# Patient Record
Sex: FEMALE | Race: OTHER | Employment: FULL TIME | URBAN - METROPOLITAN AREA
[De-identification: names, ages, dates, MRNs, and addresses within clinical notes are randomized per-mention and may not be internally consistent; named-entity substitution may affect disease eponyms.]

---

## 2017-04-05 ENCOUNTER — OFFICE VISIT (OUTPATIENT)
Dept: OTOLARYNGOLOGY | Facility: CLINIC | Age: 67
End: 2017-04-05

## 2017-04-05 VITALS
HEIGHT: 67 IN | HEART RATE: 62 BPM | DIASTOLIC BLOOD PRESSURE: 69 MMHG | BODY MASS INDEX: 30.13 KG/M2 | WEIGHT: 192 LBS | SYSTOLIC BLOOD PRESSURE: 108 MMHG

## 2017-04-05 DIAGNOSIS — M26.69 TMJ INFLAMMATION: Primary | ICD-10-CM

## 2017-04-05 DIAGNOSIS — J01.41 ACUTE RECURRENT PANSINUSITIS: ICD-10-CM

## 2017-04-05 PROCEDURE — 99203 OFFICE O/P NEW LOW 30 MIN: CPT | Performed by: OTOLARYNGOLOGY

## 2017-04-05 PROCEDURE — 99212 OFFICE O/P EST SF 10 MIN: CPT | Performed by: OTOLARYNGOLOGY

## 2017-04-05 RX ORDER — CETIRIZINE HYDROCHLORIDE 5 MG/1
5 TABLET ORAL DAILY
COMMUNITY

## 2017-04-05 RX ORDER — AMOXICILLIN AND CLAVULANATE POTASSIUM 875; 125 MG/1; MG/1
1 TABLET, FILM COATED ORAL EVERY 12 HOURS
Qty: 20 TABLET | Refills: 0 | Status: SHIPPED | OUTPATIENT
Start: 2017-04-05 | End: 2017-04-15

## 2017-04-05 RX ORDER — IBUPROFEN 200 MG
200 TABLET ORAL EVERY 6 HOURS PRN
COMMUNITY

## 2017-04-05 RX ORDER — CITALOPRAM 10 MG/1
15 TABLET ORAL DAILY
COMMUNITY

## 2017-05-09 NOTE — PROGRESS NOTES
Ju Beard is a 79year old female. Patient presents with:  Sinus Problem: Chronic sinus pain/pressure. Post nasal drip with bad breath.  Patient takes three Advil OTC  Allergies: Takes Zyrtec OTC      HISTORY OF PRESENT ILLNESS  4/5/2017  Patient 7\" (1.702 m)  Wt 192 lb (87.091 kg)  BMI 30.06 kg/m2       Constitutional Normal Overall appearance - Normal.   Psychiatric Normal Orientation - Oriented to time, place, person & situation. Appropriate mood and affect.    Neck Exam Normal Inspection - Norm 0  ASSESSMENT AND PLAN    1. TMJ inflammation  I discussed the pathophysiology of jaw joint pain and the relationship to ear pain and headaches. I reccomend avoidance of excessive chewing ie gum.   Start:  nsaids (if not medically contraindicated), heating Pdt Type: BAR-U Arms Incubation Time: 2 Hours Face And Neck Incubation Time: 1 Hour Location: Face and Scalp Frequency Of Pdt: Single Treatment Consent: The procedure and risks were reviewed with the patient including but not limited to: burning, pigmentary changes, pain, blistering, scabbing, redness, and the possibility of needing numerous treatments. Strict photoprotection after the procedure was also discussed. Face Incubation Time: 1 Hour 30 minutes Face And Scalp Incubation Time: 1 Hour for the face and 2 Hours for the scalp Detail Level: Simple

## 2018-07-19 ENCOUNTER — OFFICE VISIT (OUTPATIENT)
Dept: OTOLARYNGOLOGY | Facility: CLINIC | Age: 68
End: 2018-07-19
Payer: COMMERCIAL

## 2018-07-19 VITALS
SYSTOLIC BLOOD PRESSURE: 118 MMHG | DIASTOLIC BLOOD PRESSURE: 70 MMHG | HEIGHT: 67 IN | WEIGHT: 190 LBS | TEMPERATURE: 98 F | BODY MASS INDEX: 29.82 KG/M2

## 2018-07-19 DIAGNOSIS — J32.9 SINUSITIS, UNSPECIFIED CHRONICITY, UNSPECIFIED LOCATION: Primary | ICD-10-CM

## 2018-07-19 DIAGNOSIS — M26.609 TMJ (TEMPOROMANDIBULAR JOINT DISORDER): ICD-10-CM

## 2018-07-19 PROCEDURE — 99212 OFFICE O/P EST SF 10 MIN: CPT | Performed by: OTOLARYNGOLOGY

## 2018-07-19 PROCEDURE — 99214 OFFICE O/P EST MOD 30 MIN: CPT | Performed by: OTOLARYNGOLOGY

## 2018-07-19 RX ORDER — AMOXICILLIN AND CLAVULANATE POTASSIUM 875; 125 MG/1; MG/1
1 TABLET, FILM COATED ORAL EVERY 12 HOURS
Qty: 28 TABLET | Refills: 0 | Status: SHIPPED | OUTPATIENT
Start: 2018-07-19 | End: 2018-08-02

## 2018-07-19 RX ORDER — CITALOPRAM 20 MG/1
TABLET ORAL
COMMUNITY
Start: 2018-05-16

## 2018-07-19 NOTE — PROGRESS NOTES
León Player is a 76year old female. Patient presents with:  Sinus Problem: sinus headache, sinus pressure for 2 weeks      HISTORY OF PRESENT ILLNESS  4/5/2017  Patient prevents for evaluation of sinusitis. This is recurrent.  Recent symptoms star Fatigue, fever and weight loss. ENMT Negative Drooling. Eyes Negative Blurred vision and vision changes. Respiratory Negative Dyspnea and wheezing. Cardio Negative Chest pain, irregular heartbeat/palpitations and syncope.    GI Negative Abdominal pa Take 1 tablet by mouth every 12 (twelve) hours. , Disp: 28 tablet, Rfl: 0  •  Cetirizine HCl 5 MG Oral Tab, Take 5 mg by mouth daily. , Disp: , Rfl:   •  ibuprofen (ADVIL) 200 MG Oral Tab, Take 200 mg by mouth every 6 (six) hours as needed for Pain., Disp: ,

## 2018-07-25 ENCOUNTER — TELEPHONE (OUTPATIENT)
Dept: OTOLARYNGOLOGY | Facility: CLINIC | Age: 68
End: 2018-07-25

## 2018-07-25 NOTE — TELEPHONE ENCOUNTER
Called spoke to pt regarding Ct scan. CT scan has been approved. Auth# [id number], valid 7/23/18-9/6/18. [place] [employer]#[id number]. I did inform pt to contact [facility] for any out of pocket expense.

## 2018-08-16 ENCOUNTER — HOSPITAL ENCOUNTER (OUTPATIENT)
Dept: CT IMAGING | Facility: HOSPITAL | Age: 68
Discharge: HOME | End: 2018-08-16
Attending: OTOLARYNGOLOGY
Payer: COMMERCIAL

## 2018-08-16 DIAGNOSIS — J32.9 SINUSITIS, UNSPECIFIED CHRONICITY, UNSPECIFIED LOCATION: ICD-10-CM

## 2018-08-16 PROCEDURE — 70486 CT MAXILLOFACIAL W/O DYE: CPT | Performed by: OTOLARYNGOLOGY

## 2018-08-20 ENCOUNTER — TELEPHONE (OUTPATIENT)
Dept: OTOLARYNGOLOGY | Facility: CLINIC | Age: 68
End: 2018-08-20

## 2018-08-25 ENCOUNTER — OFFICE VISIT (OUTPATIENT)
Dept: OTOLARYNGOLOGY | Facility: CLINIC | Age: 68
End: 2018-08-25
Payer: COMMERCIAL

## 2018-08-25 VITALS
TEMPERATURE: 98 F | WEIGHT: 190 LBS | HEIGHT: 67 IN | SYSTOLIC BLOOD PRESSURE: 118 MMHG | DIASTOLIC BLOOD PRESSURE: 70 MMHG | BODY MASS INDEX: 29.82 KG/M2

## 2018-08-25 DIAGNOSIS — J32.9 SINUSITIS, UNSPECIFIED CHRONICITY, UNSPECIFIED LOCATION: Primary | ICD-10-CM

## 2018-08-25 DIAGNOSIS — M26.609 TMJ (TEMPOROMANDIBULAR JOINT DISORDER): ICD-10-CM

## 2018-08-25 PROCEDURE — 99212 OFFICE O/P EST SF 10 MIN: CPT | Performed by: OTOLARYNGOLOGY

## 2018-08-25 PROCEDURE — 99214 OFFICE O/P EST MOD 30 MIN: CPT | Performed by: OTOLARYNGOLOGY

## 2018-08-25 RX ORDER — METHYLPREDNISOLONE 4 MG/1
TABLET ORAL
COMMUNITY
Start: 2018-07-26 | End: 2018-08-25

## 2018-08-25 RX ORDER — TIZANIDINE 4 MG/1
TABLET ORAL
COMMUNITY
Start: 2018-07-26

## 2018-08-25 NOTE — PROGRESS NOTES
Merlyn Avalos is a 76year old female. Patient presents with:  Test Results: CT scan results      HISTORY OF PRESENT ILLNESS  4/5/2017  Patient prevents for evaluation of sinusitis. This is recurrent. Recent symptoms started 2 weeks ago .  Patient Smokeless tobacco: Never Used                        Alcohol use: Yes                Comment: Couple of glasses of wine per night      History reviewed. No pertinent family history.   Past Medical History:   Diagnosis Date   • Anxiety    • Normal.        Lymph Detail Normal Submental. Submandibular. Anterior cervical. Posterior cervical. Supraclavicular.               Nose/Mouth/Throat abNormal Nares - Right: Normal Left: Normal. Septum deviated with turbinate hypertrophy bilaterally mucosa c do not think she needs surgery at this time to think medically she is managed well with the Flonase and Claritin follow-up on an as-needed basis      Luanne Torres MD

## (undated) NOTE — MR AVS SNAPSHOT
[address]  [address]  [phone]               Thank you for choosing us for your health care visit with Ayala Ledesma MD.  We are glad to serve you and happy to provide you with this summary To schedule Physical Therapy at any of the [facility] facilities, please call [phone]. [place] for YELITZA WILSON [facility]  [address]  [address] [facility], [address]    [address] [facility] [address], [address] [facility], [address] [place] [address]     Phone:  [phone]    - Amoxicillin-Pot Clavulanate 875125 MG Tabs            MyChart     Visit MyChart  You can access your MyChart to more actively manage y increments are effective and add up over the week   2 ½ hours per week – spread out over time Use a peter to keep you motivated   Don’t forget strength training with weights and resistance Set goals and track your progress   You don’t need to join a gym.